# Patient Record
Sex: MALE | Race: WHITE | Employment: FULL TIME | URBAN - METROPOLITAN AREA
[De-identification: names, ages, dates, MRNs, and addresses within clinical notes are randomized per-mention and may not be internally consistent; named-entity substitution may affect disease eponyms.]

---

## 2017-06-14 PROBLEM — N50.89 TESTICULAR MASS: Status: ACTIVE | Noted: 2017-06-14

## 2017-06-29 RX ORDER — ACETAMINOPHEN 500 MG
TABLET ORAL
COMMUNITY
End: 2017-07-18

## 2017-06-30 ENCOUNTER — ANESTHESIA EVENT (OUTPATIENT)
Dept: SURGERY | Age: 42
End: 2017-06-30
Payer: COMMERCIAL

## 2017-07-03 ENCOUNTER — ANESTHESIA (OUTPATIENT)
Dept: SURGERY | Age: 42
End: 2017-07-03
Payer: COMMERCIAL

## 2017-07-03 ENCOUNTER — HOSPITAL ENCOUNTER (OUTPATIENT)
Age: 42
Setting detail: OUTPATIENT SURGERY
Discharge: HOME | End: 2017-07-03
Attending: UROLOGY | Admitting: UROLOGY
Payer: COMMERCIAL

## 2017-07-03 VITALS
DIASTOLIC BLOOD PRESSURE: 67 MMHG | TEMPERATURE: 98.5 F | BODY MASS INDEX: 34.45 KG/M2 | HEIGHT: 71 IN | WEIGHT: 246.06 LBS | HEART RATE: 68 BPM | SYSTOLIC BLOOD PRESSURE: 112 MMHG | OXYGEN SATURATION: 97 % | RESPIRATION RATE: 16 BRPM

## 2017-07-03 PROCEDURE — 77030018836 HC SOL IRR NACL ICUM -A: Performed by: UROLOGY

## 2017-07-03 PROCEDURE — 74011000250 HC RX REV CODE- 250: Performed by: UROLOGY

## 2017-07-03 PROCEDURE — 77030003029 HC SUT VCRL J&J -B: Performed by: UROLOGY

## 2017-07-03 PROCEDURE — 76210000006 HC OR PH I REC 0.5 TO 1 HR: Performed by: UROLOGY

## 2017-07-03 PROCEDURE — 76060000032 HC ANESTHESIA 0.5 TO 1 HR: Performed by: UROLOGY

## 2017-07-03 PROCEDURE — 74011250636 HC RX REV CODE- 250/636: Performed by: NURSE ANESTHETIST, CERTIFIED REGISTERED

## 2017-07-03 PROCEDURE — 77030032490 HC SLV COMPR SCD KNE COVD -B: Performed by: UROLOGY

## 2017-07-03 PROCEDURE — 74011000250 HC RX REV CODE- 250

## 2017-07-03 PROCEDURE — 77030012510 HC MSK AIRWY LMA TELE -B: Performed by: ANESTHESIOLOGY

## 2017-07-03 PROCEDURE — 76210000021 HC REC RM PH II 0.5 TO 1 HR: Performed by: UROLOGY

## 2017-07-03 PROCEDURE — 88304 TISSUE EXAM BY PATHOLOGIST: CPT | Performed by: UROLOGY

## 2017-07-03 PROCEDURE — 74011250636 HC RX REV CODE- 250/636

## 2017-07-03 PROCEDURE — 77030011640 HC PAD GRND REM COVD -A: Performed by: UROLOGY

## 2017-07-03 PROCEDURE — 74011250636 HC RX REV CODE- 250/636: Performed by: UROLOGY

## 2017-07-03 PROCEDURE — 77030018823 HC SLV COMPR VENO -B: Performed by: UROLOGY

## 2017-07-03 PROCEDURE — 77030032020 HC SUPP SCROT 3M -A: Performed by: UROLOGY

## 2017-07-03 PROCEDURE — 88305 TISSUE EXAM BY PATHOLOGIST: CPT | Performed by: UROLOGY

## 2017-07-03 PROCEDURE — 74011250637 HC RX REV CODE- 250/637: Performed by: NURSE ANESTHETIST, CERTIFIED REGISTERED

## 2017-07-03 PROCEDURE — 76010000160 HC OR TIME 0.5 TO 1 HR INTENSV-TIER 1: Performed by: UROLOGY

## 2017-07-03 RX ORDER — SODIUM CHLORIDE, SODIUM LACTATE, POTASSIUM CHLORIDE, CALCIUM CHLORIDE 600; 310; 30; 20 MG/100ML; MG/100ML; MG/100ML; MG/100ML
50 INJECTION, SOLUTION INTRAVENOUS CONTINUOUS
Status: DISCONTINUED | OUTPATIENT
Start: 2017-07-04 | End: 2017-07-03 | Stop reason: HOSPADM

## 2017-07-03 RX ORDER — PROPOFOL 10 MG/ML
INJECTION, EMULSION INTRAVENOUS AS NEEDED
Status: DISCONTINUED | OUTPATIENT
Start: 2017-07-03 | End: 2017-07-03 | Stop reason: HOSPADM

## 2017-07-03 RX ORDER — FAMOTIDINE 20 MG/1
TABLET, FILM COATED ORAL
Status: DISCONTINUED
Start: 2017-07-03 | End: 2017-07-03 | Stop reason: HOSPADM

## 2017-07-03 RX ORDER — DOCUSATE SODIUM 100 MG/1
100 CAPSULE, LIQUID FILLED ORAL
Qty: 60 CAP | Refills: 2 | Status: SHIPPED | OUTPATIENT
Start: 2017-07-03 | End: 2017-07-18

## 2017-07-03 RX ORDER — FENTANYL CITRATE 50 UG/ML
50 INJECTION, SOLUTION INTRAMUSCULAR; INTRAVENOUS AS NEEDED
Status: DISCONTINUED | OUTPATIENT
Start: 2017-07-03 | End: 2017-07-03 | Stop reason: HOSPADM

## 2017-07-03 RX ORDER — BUPIVACAINE HYDROCHLORIDE 2.5 MG/ML
INJECTION, SOLUTION EPIDURAL; INFILTRATION; INTRACAUDAL AS NEEDED
Status: DISCONTINUED | OUTPATIENT
Start: 2017-07-03 | End: 2017-07-03 | Stop reason: HOSPADM

## 2017-07-03 RX ORDER — FENTANYL CITRATE 50 UG/ML
INJECTION, SOLUTION INTRAMUSCULAR; INTRAVENOUS AS NEEDED
Status: DISCONTINUED | OUTPATIENT
Start: 2017-07-03 | End: 2017-07-03 | Stop reason: HOSPADM

## 2017-07-03 RX ORDER — SODIUM CHLORIDE, SODIUM LACTATE, POTASSIUM CHLORIDE, CALCIUM CHLORIDE 600; 310; 30; 20 MG/100ML; MG/100ML; MG/100ML; MG/100ML
50 INJECTION, SOLUTION INTRAVENOUS CONTINUOUS
Status: DISCONTINUED | OUTPATIENT
Start: 2017-07-03 | End: 2017-07-03 | Stop reason: HOSPADM

## 2017-07-03 RX ORDER — LIDOCAINE HYDROCHLORIDE 10 MG/ML
0.1 INJECTION, SOLUTION EPIDURAL; INFILTRATION; INTRACAUDAL; PERINEURAL AS NEEDED
Status: DISCONTINUED | OUTPATIENT
Start: 2017-07-03 | End: 2017-07-03 | Stop reason: HOSPADM

## 2017-07-03 RX ORDER — OXYCODONE AND ACETAMINOPHEN 5; 325 MG/1; MG/1
1-2 TABLET ORAL
Qty: 30 TAB | Refills: 0 | Status: SHIPPED | OUTPATIENT
Start: 2017-07-03 | End: 2017-07-18

## 2017-07-03 RX ORDER — MIDAZOLAM HYDROCHLORIDE 1 MG/ML
INJECTION, SOLUTION INTRAMUSCULAR; INTRAVENOUS AS NEEDED
Status: DISCONTINUED | OUTPATIENT
Start: 2017-07-03 | End: 2017-07-03 | Stop reason: HOSPADM

## 2017-07-03 RX ORDER — FAMOTIDINE 20 MG/1
20 TABLET, FILM COATED ORAL ONCE
Status: COMPLETED | OUTPATIENT
Start: 2017-07-04 | End: 2017-07-03

## 2017-07-03 RX ORDER — LIDOCAINE HYDROCHLORIDE 20 MG/ML
INJECTION, SOLUTION EPIDURAL; INFILTRATION; INTRACAUDAL; PERINEURAL AS NEEDED
Status: DISCONTINUED | OUTPATIENT
Start: 2017-07-03 | End: 2017-07-03 | Stop reason: HOSPADM

## 2017-07-03 RX ORDER — HYDROMORPHONE HYDROCHLORIDE 2 MG/ML
0.5 INJECTION, SOLUTION INTRAMUSCULAR; INTRAVENOUS; SUBCUTANEOUS
Status: DISCONTINUED | OUTPATIENT
Start: 2017-07-03 | End: 2017-07-03 | Stop reason: HOSPADM

## 2017-07-03 RX ORDER — CEFAZOLIN SODIUM 2 G/50ML
2 SOLUTION INTRAVENOUS
Status: COMPLETED | OUTPATIENT
Start: 2017-07-03 | End: 2017-07-03

## 2017-07-03 RX ADMIN — PROPOFOL 200 MG: 10 INJECTION, EMULSION INTRAVENOUS at 11:48

## 2017-07-03 RX ADMIN — LIDOCAINE HYDROCHLORIDE 100 MG: 20 INJECTION, SOLUTION EPIDURAL; INFILTRATION; INTRACAUDAL; PERINEURAL at 11:48

## 2017-07-03 RX ADMIN — MIDAZOLAM HYDROCHLORIDE 2 MG: 1 INJECTION, SOLUTION INTRAMUSCULAR; INTRAVENOUS at 11:44

## 2017-07-03 RX ADMIN — CEFAZOLIN SODIUM 2 G: 2 SOLUTION INTRAVENOUS at 11:56

## 2017-07-03 RX ADMIN — SODIUM CHLORIDE, SODIUM LACTATE, POTASSIUM CHLORIDE, AND CALCIUM CHLORIDE 50 ML/HR: 600; 310; 30; 20 INJECTION, SOLUTION INTRAVENOUS at 10:06

## 2017-07-03 RX ADMIN — FAMOTIDINE 20 MG: 20 TABLET ORAL at 09:55

## 2017-07-03 RX ADMIN — FENTANYL CITRATE 100 MCG: 50 INJECTION, SOLUTION INTRAMUSCULAR; INTRAVENOUS at 11:48

## 2017-07-03 NOTE — ANESTHESIA PREPROCEDURE EVALUATION
Anesthetic History   No history of anesthetic complications            Review of Systems / Medical History  Patient summary reviewed and pertinent labs reviewed    Pulmonary        Sleep apnea: No treatment           Neuro/Psych   Within defined limits           Cardiovascular  Within defined limits                Exercise tolerance: >4 METS     GI/Hepatic/Renal     GERD: well controlled           Endo/Other  Within defined limits           Other Findings   Comments:   Risk Factors for Postoperative nausea/vomiting:       History of postoperative nausea/vomiting? NO       Female? NO       Motion sickness? NO       Intended opioid administration for postoperative analgesia? YES      Smoking Abstinence  Current Smoker? NO  Elective Surgery? YES  Seen preoperatively by anesthesiologist or proxy prior to day of surgery? YES  Pt abstained from smoking 24 hours prior to anesthesia?  N/A           Physical Exam    Airway  Mallampati: II  TM Distance: 4 - 6 cm  Neck ROM: normal range of motion   Mouth opening: Normal     Cardiovascular    Rhythm: regular  Rate: normal         Dental  No notable dental hx       Pulmonary  Breath sounds clear to auscultation               Abdominal  GI exam deferred       Other Findings            Anesthetic Plan    ASA: 2  Anesthesia type: general          Induction: Intravenous  Anesthetic plan and risks discussed with: Patient

## 2017-07-03 NOTE — H&P
History and physical review. The patient has been examined. There have been no significant clinical changes. NAD, CTAB, RRR    L Side marked  Ancef On call to [address] [place] [address] to proceed with procedure as below    Manju Byers MD  [facility] of [address] [place] [address] [address] [facility], [address] [facility]  Phone: [phone]  Pager: [phone]          Maeve Rivera   1975   39 y.o.         ASSESSMENT:       ICD-10-CM ICD-9-CM   1. Testicular mass N50.9 608.89      1. Maeve Rivera 39 y.o. WM w/ a left sided testicular mass. Testicular US 5/31/2017 - Palpable abnormality corresponds with an oval solid mass in the wall of the left lateral scrotum.         PLAN:    Reviewed testicular US in detail, mass seen. At this time recommend an excision of left scrotal mass. Discussed alternatives including MRI, observation or needle biopsy  Discussed the likelihood that mass is benign. Return for scheduled surgery date. R/B discussed             Chief Complaint   Patient presents with    Other       testicular mass         HISTORY OF PRESENT ILLNESS:  Maeve Rivera is a 39 y.o. male who is seen in consultation for a scrotal mass. Pt states he thinks its been there x20 year   He has never experienced pain or other symptoms related to the lesion. Recently, he completed a US which revealed a 2.6 cm palpable oval shaped solid mass in the wall of the left lateral scrotum. Pt referred to discuss possible treatment options of his scrotal lesion. No drainage  Pt has no significant surgical history. Denies a concerning medical history. He is overall a healthy male.      Otherwise, pt is doing well. No bothersome urinary symptoms to report. Denies frequency, urgency, dysuria, gross hematuria.    No UTI symptoms such as fever, chills, nausea, or vomiting.      Location: Left scrotum  Severity: Asymptomatic  Duration: x20 years  Timing: Testicular US confirmed solid mass on 5/31/2017  Context: 2.6 cm left testicular mass      No flowsheet data found.     History reviewed. No pertinent past medical history.     History reviewed. No pertinent surgical history.          Social History   Substance Use Topics    Smoking status: Never Smoker    Smokeless tobacco: None    Alcohol use None      No Known Allergies     History reviewed. No pertinent family history.            Current Outpatient Prescriptions   Medication Sig Dispense Refill    CREATINE MONOHYDRATE (CREATINE PO) Take  by mouth.        naproxen sodium (ALEVE) 220 mg cap Take  by mouth.        MILK THISTLE PO Take  by mouth.        vit B Cmplx 3-FA-Vit C-Biotin (NEPHRO ALEXYS RX) 1- mg-mg-mcg tablet Take 1 Tab by mouth daily.        DOCOSAHEXANOIC ACID/EPA (FISH OIL PO) Take  by mouth.        omeprazole (PRILOSEC) 40 mg capsule TK 1 C PO D 30 MIN B MAKI   1      Review of Systems  Constitutional: Fever: negative  Skin: Rash:  negative  HEENT: Hearing difficulty:  negative  Eyes: Blurred vision:  negative  Cardiovascular: Chest pain:  negative  Respiratory: Shortness of breath:  negative  Gastrointestinal: Nausea/vomiting:  negative  Musculoskeletal: Back pain:  negative  Neurological: Weakness:  negative  Psychological: Memory loss:  negative  Comments/additional findings:  negative           PHYSICAL EXAMINATION:        Visit Vitals    /80    Ht 5' 11\" (1.803 m)    Wt 243 lb (110.2 kg)    BMI 33.89 kg/m2   Constitutional: WDWN, Pleasant and appropriate affect, No acute distress. CV:  No peripheral swelling noted  Respiratory: No respiratory distress or difficulties  Abdomen:  No abdominal masses or tenderness. No CVA tenderness. No inguinal hernias noted.  Male:    SCROTUM: 2 x 1 cm mobile solid mass at the base of the left scrotum separate from spermatic cord. No testicular mass  PENIS: Urethral meatus normal in location and size. No urethral discharge. Skin: No jaundice.     Neuro/Psych:  Alert and oriented x 3, affect appropriate. Lymphatic:   No enlarged inguinal lymph nodes.          REVIEW OF LABS AND IMAGING:    No results found for this or any previous visit.        US Testicular 5/31/2017  IMPRESSION:    Palpable abnormality corresponds with an oval solid mass in the wall of the left lateral scrotum. Etiology is unclear, could be a lipoma.  Malignant extratesticular scrotal masses are very uncommon.           A copy of today's office visit with all pertinent imaging results and labs were sent to the referring physician.    Maria Fernanda Meeks MD  [facility] [place]  [address] [facility], [address] [facility]  Phone: [phone]  Pager: [phone]

## 2017-07-03 NOTE — BRIEF OP NOTE
BRIEF OPERATIVE NOTE    Date of Procedure: 7/3/2017   Preoperative Diagnosis: testicular mass  n50.9  Postoperative Diagnosis: testicular mass  n50.9    Procedure(s):  excision of left scrotal mass  Surgeon(s) and Role:     * Mono Page MD - Primary         Assistant Staff:       Surgical Staff:  Circ-1: Tracy Lugo RN  Circ-Relief: Shahab Hill  Scrub Tech-1: [facility]  Event Time In   Incision Start 12:06 PM   Incision Close 12:25 PM     Anesthesia: General   Estimated Blood Loss: minimal  Specimens:   ID Type Source Tests Collected by Time Destination   1 : left scrotal mass Preservative   Mono Page MD 7/3/2017 12:16 PM Pathology      Findings: see dictation   Complications: none  Implants: * No implants in log *

## 2017-07-03 NOTE — DISCHARGE INSTRUCTIONS
DISCHARGE SUMMARY from Nurse    The following personal items are in your possession at time of discharge:    Dental Appliances: None  Visual Aid: None     Home Medications: None  Jewelry: None  Clothing: Shirt, Shorts, Footwear, Socks, Undergarments  Other Valuables: None             PATIENT INSTRUCTIONS:    After general anesthesia or intravenous sedation, for 24 hours or while taking prescription Narcotics:  · Limit your activities  · Do not drive and operate hazardous machinery  · Do not make important personal or business decisions  · Do  not drink alcoholic beverages  · If you have not urinated within 8 hours after discharge, please contact your surgeon on call. Report the following to your surgeon:  · Excessive pain, swelling, redness or odor of or around the surgical area  · Temperature over 100.5  · Nausea and vomiting lasting longer than 4 hours or if unable to take medications  · Any signs of decreased circulation or nerve impairment to extremity: change in color, persistent  numbness, tingling, coldness or increase pain  · Any questions        What to do at Home:        These are general instructions for a healthy lifestyle:    No smoking/ No tobacco products/ Avoid exposure to second hand smoke    Surgeon General's Warning:  Quitting smoking now greatly reduces serious risk to your health. Obesity, smoking, and sedentary lifestyle greatly increases your risk for illness    A healthy diet, regular physical exercise & weight monitoring are important for maintaining a healthy lifestyle    You may be retaining fluid if you have a history of heart failure or if you experience any of the following symptoms:  Weight gain of 3 pounds or more overnight or 5 pounds in a week, increased swelling in our hands or feet or shortness of breath while lying flat in bed. Please call your doctor as soon as you notice any of these symptoms; do not wait until your next office visit.     Recognize signs and symptoms of STROKE:    F-face looks uneven    A-arms unable to move or move unevenly    S-speech slurred or non-existent    T-time-call 911 as soon as signs and symptoms begin-DO NOT go       Back to bed or wait to see if you get better-TIME IS BRAIN. Warning Signs of HEART ATTACK     Call 911 if you have these symptoms:   Chest discomfort. Most heart attacks involve discomfort in the center of the chest that lasts more than a few minutes, or that goes away and comes back. It can feel like uncomfortable pressure, squeezing, fullness, or pain.  Discomfort in other areas of the upper body. Symptoms can include pain or discomfort in one or both arms, the back, neck, jaw, or stomach.  Shortness of breath with or without chest discomfort.  Other signs may include breaking out in a cold sweat, nausea, or lightheadedness. Don't wait more than five minutes to call 911 - MINUTES MATTER! Fast action can save your life. Calling 911 is almost always the fastest way to get lifesaving treatment. Emergency Medical Services staff can begin treatment when they arrive -- up to an hour sooner than if someone gets to the hospital by car. The discharge information has been reviewed with the patient. The patient verbalized understanding. Discharge medications reviewed with the patient and appropriate educational materials and side effects teaching were provided. Patient armband removed and given to patient to take home.   Patient was informed of the privacy risks if armband lost or stolen

## 2017-07-03 NOTE — IP AVS SNAPSHOT
[address] [place] [address] Genesis Aylin Weinstein 
[phone] Patient: January Albarran MRN: [id number] :1975 You are allergic to the following No active allergies Recent Documentation Height Weight BMI Smoking Status 1.803 m 111.6 kg 34.32 kg/m2 Never Smoker Emergency Contacts Name Discharge Info Relation Home Work Mobile [address] CAREGIVER [3] Spouse [3]   [phone] About your hospitalization You were admitted on:  July 3, 2017 You last received care in the:  [facility] PHASE 2 RECOVERY You were discharged on:  July 3, 2017 Unit phone number:  [phone] Why you were hospitalized Your primary diagnosis was:  Not on File Providers Seen During Your Hospitalizations Provider Role Specialty Primary office phone Marlin Saez MD Attending Provider Urology [phone] Your Primary Care Physician (PCP) Primary Care Physician Office Phone Office Fax Ashlee Vernon [phone] Follow-up Information Follow up With Details Comments Contact Info Carolyne Leger MD   [address] [place] [address] [address] [id number] 
[phone] Your Appointments 2017  1:30 PM EDT  
ESTABLISHED PATIENT with Marlin Saez MD  
Urology of [facility]) [address] [facility], [facility] [address] [address]  
[phone] Current Discharge Medication List  
  
START taking these medications Dose & Instructions Dispensing Information Comments Morning Noon Evening Bedtime  
 docusate sodium 100 mg capsule Commonly known as:  Nimesh Rivers Your last dose was: Your next dose is:    
   
   
 Dose:  100 mg Take 1 Cap by mouth two (2) times daily as needed for Constipation for up to 90 days. Quantity:  60 Cap Refills:  2 oxyCODONE-acetaminophen 5-325 mg per tablet Commonly known as:  PERCOCET Your last dose was: Your next dose is:    
   
   
 Dose:  1-2 Tab Take 1-2 Tabs by mouth every four (4) hours as needed for Pain. Max Daily Amount: 12 Tabs. Quantity:  30 Tab Refills:  0 CONTINUE these medications which have NOT CHANGED Dose & Instructions Dispensing Information Comments Morning Noon Evening Bedtime ALEVE 220 mg Cap Generic drug:  naproxen sodium Your last dose was: Your next dose is: Take  by mouth. Refills:  0  
     
   
   
   
  
 CREATINE PO Your last dose was: Your next dose is: Take  by mouth. Refills:  0  
     
   
   
   
  
 FISH OIL PO Your last dose was: Your next dose is: Take  by mouth. Refills:  0 MILK THISTLE PO Your last dose was: Your next dose is: Take  by mouth. Refills:  0  
     
   
   
   
  
 omeprazole 40 mg capsule Commonly known as:  PRILOSEC Your last dose was: Your next dose is:    
   
   
 TK 1 C PO D 30 MIN B MAKI Refills:  1 TYLENOL EXTRA STRENGTH 500 mg tablet Generic drug:  acetaminophen Your last dose was: Your next dose is: Take  by mouth every six (6) hours as needed for Pain. Refills:  0  
     
   
   
   
  
 vit B Cmplx 3-FA-Vit C-Biotin 1- mg-mg-mcg tablet Commonly known as:  NEPHRO ALEXYS RX Your last dose was: Your next dose is:    
   
   
 Dose:  1 Tab Take 1 Tab by mouth daily. Refills:  0 Where to Get Your Medications Information on where to get these meds will be given to you by the nurse or doctor. ! Ask your nurse or doctor about these medications  
  docusate sodium 100 mg capsule oxyCODONE-acetaminophen 5-325 mg per tablet Discharge Instructions DISCHARGE SUMMARY from Nurse The following personal items are in your possession at time of discharge: 
 
Dental Appliances: None Visual Aid: None Home Medications: None Jewelry: None Clothing: Shirt, Shorts, Footwear, Socks, Undergarments Other Valuables: None PATIENT INSTRUCTIONS: 
 
After general anesthesia or intravenous sedation, for 24 hours or while taking prescription Narcotics: · Limit your activities · Do not drive and operate hazardous machinery · Do not make important personal or business decisions · Do  not drink alcoholic beverages · If you have not urinated within 8 hours after discharge, please contact your surgeon on call. Report the following to your surgeon: 
· Excessive pain, swelling, redness or odor of or around the surgical area · Temperature over 100.5 · Nausea and vomiting lasting longer than 4 hours or if unable to take medications · Any signs of decreased circulation or nerve impairment to extremity: change in color, persistent  numbness, tingling, coldness or increase pain · Any questions What to do at Home: These are general instructions for a healthy lifestyle: No smoking/ No tobacco products/ Avoid exposure to second hand smoke Surgeon General's Warning:  Quitting smoking now greatly reduces serious risk to your health. Obesity, smoking, and sedentary lifestyle greatly increases your risk for illness A healthy diet, regular physical exercise & weight monitoring are important for maintaining a healthy lifestyle You may be retaining fluid if you have a history of heart failure or if you experience any of the following symptoms:  Weight gain of 3 pounds or more overnight or 5 pounds in a week, increased swelling in our hands or feet or shortness of breath while lying flat in bed.   Please call your doctor as soon as you notice any of these symptoms; do not wait until your next office visit. Recognize signs and symptoms of STROKE: 
 
F-face looks uneven A-arms unable to move or move unevenly S-speech slurred or non-existent T-time-call 911 as soon as signs and symptoms begin-DO NOT go Back to bed or wait to see if you get better-TIME IS BRAIN. Warning Signs of HEART ATTACK Call 911 if you have these symptoms: 
? Chest discomfort. Most heart attacks involve discomfort in the center of the chest that lasts more than a few minutes, or that goes away and comes back. It can feel like uncomfortable pressure, squeezing, fullness, or pain. ? Discomfort in other areas of the upper body. Symptoms can include pain or discomfort in one or both arms, the back, neck, jaw, or stomach. ? Shortness of breath with or without chest discomfort. ? Other signs may include breaking out in a cold sweat, nausea, or lightheadedness. Don't wait more than five minutes to call [address]! Fast action can save your life. Calling 911 is almost always the fastest way to get lifesaving treatment. Emergency Medical Services staff can begin treatment when they arrive  up to an hour sooner than if someone gets to the hospital by car. The discharge information has been reviewed with the patient. The patient verbalized understanding. Discharge medications reviewed with the patient and appropriate educational materials and side effects teaching were provided. Patient armband removed and given to patient to take home. Patient was informed of the privacy risks if armband lost or stolen Discharge Orders None Introducing [facility] SERVICES! Devika Huber introduces [employer] patient portal. Now you can access parts of your medical record, email your doctor's office, and request medication refills online. 1. In your internet browser, go to [email or web address] 2. Click on the First Time User? Click Here link in the Sign In box. You will see the New Member Sign Up page. 3. Enter your [employer] Access Code exactly as it appears below. You will not need to use this code after youve completed the sign-up process. If you do not sign up before the expiration date, you must request a new code. · [employer] Access Code: [email or web address] Expires: 9/28/2017 10:39 AM 
 
4. Enter the last four digits of your Social Security Number (xxxx) and Date of Birth (mm/dd/yyyy) as indicated and click Submit. You will be taken to the next sign-up page. 5. Create a [employer] ID. This will be your [employer] login ID and cannot be changed, so think of one that is secure and easy to remember. 6. Create a [employer] password. You can change your password at any time. 7. Enter your Password Reset Question and Answer. This can be used at a later time if you forget your password. 8. Enter your e-mail address. You will receive e-mail notification when new information is available in [address] 9. Click Sign Up. You can now view and download portions of your medical record. 10. Click the Download Summary menu link to download a portable copy of your medical information. If you have questions, please visit the Frequently Asked Questions section of the [employer] website. Remember, [employer] is NOT to be used for urgent needs. For medical emergencies, dial 911. Now available from your iPhone and Android! General Information Please provide this summary of care documentation to your next provider. Patient Signature:  ____________________________________________________________ Date:  ____________________________________________________________  
  
Effie Jean Provider Signature:  ____________________________________________________________ Date:  ____________________________________________________________

## 2017-07-03 NOTE — PERIOP NOTES
1235  Patient received in PACU and connected to monitors. Vital signs stable. RN at bedside. Will continue to monitor. 0  Spoke to pt's wife in waiting area re update and plan of care. Awaiting anesthesia sign out.

## 2017-07-03 NOTE — ANESTHESIA POSTPROCEDURE EVALUATION
Post-Anesthesia Evaluation and Assessment    Patient: Storm Cary MRN: [id number]  SSN: [id number]    YOB: 1975  Age: 39 y.o. Sex: male       Cardiovascular Function/Vital Signs  Visit Vitals    /73    Pulse 65    Temp 36.9 °C (98.5 °F)    Resp 10    Ht 5' 11\" (1.803 m)    Wt 111.6 kg (246 lb 1 oz)    SpO2 97%    BMI 34.32 kg/m2       Patient is status post general anesthesia for Procedure(s):  excision of left scrotal mass. Nausea/Vomiting: None    Postoperative hydration reviewed and adequate. Pain:  Pain Scale 1: Numeric (0 - 10) (07/03/17 1305)  Pain Intensity 1: 2 (07/03/17 1305)   Managed    Neurological Status:   Neuro (WDL): Within Defined Limits (07/03/17 1235)   At baseline    Mental Status and Level of Consciousness: Alert and oriented     Pulmonary Status:   O2 Device: Room air (07/03/17 1305)   Adequate oxygenation and airway patent    Complications related to anesthesia: None    Post-anesthesia assessment completed.  No concerns    Signed By: Vinay Howard MD     July 3, 2017

## 2017-07-04 NOTE — OP NOTES
Cirilo Moreau    Name:  Grey Brumfield  MR#:  [id number]  :  1975  Account #:  [de-identified]  Date of Adm:  2017  Date of Surgery:  2017      PREOPERATIVE DIAGNOSIS: Left paratesticular scrotal mass. POSTOPERATIVE DIAGNOSIS: Left paratesticular scrotal mass. PROCEDURES PERFORMED: Excision of left scrotal mass. SURGEON: Randi Barton MD    ASSISTANT: None. ANESTHESIA: General.    FLUIDS: Crystalloid. ESTIMATED BLOOD LOSS: Minimal.    SPECIMENS REMOVED: Left scrotal mass. DRAINS: None. INTRAOPERATIVE FINDINGS: On scrotal exploration, a 3 cm  superficial mass was noted, not associated with the left testicle or cord. This mass appeared to be superficial to the Dartos fascia, consistent  with a lipoma. INDICATIONS FOR THE PROCEDURE: The patient is a 17-year-old  male with a longstanding history of a left superficial scrotal mass. The  patient has noticed that it has continued to get larger in size. Risks,  benefits, and alternatives of the above-stated procedure were  explained. The patient decided to proceed. DETAILS OF THE PROCEDURE: The patient was first identified in the  holding area and taken back to the operating room. Perioperative  antibiotics were given and sequential compression devices placed. Anesthesia was induced. The patient was placed in the supine position  taking care to pad all pressure points. The patient was prepped and  draped in the usual sterile fashion. A timeout was performed. First, the mass was located on the posterior aspect of the scrotum,  near the scrotal peritoneal junction. It appeared superficial. We made  approximately a 2.5 cm using a #15 surgical steel blade. We then used  hemostats and cautery to excise the mass entirely. There was a small  vessel at the inferior aspect of the mass that was tied with a 3-0 silk tie.   Once this was done, we assured hemostasis and the mass was  removed intact. We first closed the subcuticular layer with a running 3-  0 Vicryl after first infiltrating 10 mL of Marcaine. We then closed the  skin with a 4-0 Chromic in an interrupted horizontal mattress fashion. Hemostasis had been assured. All lap, sponge and needle counts were  correct. The patient was awakened from anesthesia and taken back  to PACU in stable condition. I was scrubbed and present for all critical  portions of the case.         MD ELENA Goss / EPI  D:  07/03/2017   12:34  T:  07/03/2017   20:45  Job #:  [id number]

## 2017-07-18 PROBLEM — D17.9 LIPOMA: Status: ACTIVE | Noted: 2017-07-18

## 2022-03-18 PROBLEM — N50.89 TESTICULAR MASS: Status: ACTIVE | Noted: 2017-06-14

## 2022-03-19 PROBLEM — D17.9 LIPOMA: Status: ACTIVE | Noted: 2017-07-18

## 2023-08-30 ENCOUNTER — HOSPITAL ENCOUNTER (OUTPATIENT)
Facility: HOSPITAL | Age: 48
Setting detail: RECURRING SERIES
Discharge: HOME | End: 2023-09-02
Payer: COMMERCIAL

## 2023-08-30 PROCEDURE — 97161 PT EVAL LOW COMPLEX 20 MIN: CPT

## 2023-08-30 PROCEDURE — 97535 SELF CARE MNGMENT TRAINING: CPT

## 2023-08-30 NOTE — THERAPY EVALUATION
Negative; Spurling's Test Negative right and Positive left. Not Tested/To Be Assessed Tinel's, Phalen's and Thoracic Outlet/Inlet testing. Patient would benefit from skilled PT to decrease pain and to address these deficits and to establish progressive HEP/Self-Care Routine to restore PLOF and prevent recurrence. Evaluation Complexity:  History:  LOW Complexity : Zero comorbidities / personal factors that will impact the outcome / POC; Examination:  MEDIUM Complexity : 3 Standardized tests and measures addressin body structure, function, activity limitation and / or participation in recreation  ;Presentation:  MEDIUM Complexity : Evolving with changing characteristics  ; Clinical Decision Making:  MEDIUM Complexity : FOTO score of 26-74 FOTO score = an established functional score where 100 = no disability  Overall Complexity Rating: MEDIUM  Problem List: pain affecting function, decrease ROM, decrease strength, decrease ADL/functional abilities, decrease activity tolerance, and decrease flexibility/joint mobility   Treatment Plan may include any combination of the followin Therapeutic Exercise, 62645 Neuromuscular Re-Education, 51843 Manual Therapy, 19784 Therapeutic Activity, 76771 Self Care/Home Management, 22066 Electrical Stim unattended, 56535 Ultrasound, and 41328 Mechanical Traction; MHP/CP prn. Patient / Family readiness to learn indicated by: asking questions, trying to perform skills, and interest  Persons(s) to be included in education: patient (P)  Barriers to Learning/Limitations: none  Measures taken if barriers to learning present: NA  Patient Goal (s): \"relief\"  Patient Self Reported Health Status: good  Rehabilitation Potential: good    Short Term Goals: To be accomplished in 2-3 weeks  Patient independent and compliant with progressive HEP and Self-Care Routine. Status at IE:  No HEP/Initiated. Decrease max pain scale rating to </= 2/10. Status at IE:  up to 6/10.   Increase

## 2023-08-30 NOTE — PROGRESS NOTES
Eval/POC. Patient will continue to benefit from skilled PT / OT services to modify and progress therapeutic interventions, analyze and address functional mobility deficits, analyze and address ROM deficits, analyze and address strength deficits, analyze and address soft tissue restrictions, analyze and cue for proper movement patterns, analyze and modify for postural abnormalities, and instruct in home and community integration to address functional deficits and attain remaining goals. Progress toward goals / Updated goals:  []  See Progress Note/Recertification    See Eval/POC.     Next PN/ RC due 09/28/2023  Auth due NA    PLAN  Yes  Continue plan of care  [x]  Upgrade activities as tolerated  []  Discharge due to :  []  Other:    Ramona Hankins PT    8/30/2023    4:10 PM    Future Appointments   Date Time Provider [address]   9/13/2023  8:20 AM Ramona Hankins PT [facility]   9/20/2023  8:20 AM Ramona Hankins PT [facility]   9/27/2023  8:20 AM Ramona Hankins PT [facility]   10/4/2023  8:20 AM Ramona Hankins PT [facility]

## 2023-09-06 ENCOUNTER — APPOINTMENT (OUTPATIENT)
Facility: HOSPITAL | Age: 48
End: 2023-09-06
Payer: COMMERCIAL

## 2023-09-13 ENCOUNTER — HOSPITAL ENCOUNTER (OUTPATIENT)
Facility: HOSPITAL | Age: 48
Setting detail: RECURRING SERIES
Discharge: HOME | End: 2023-09-16
Payer: COMMERCIAL

## 2023-09-13 PROCEDURE — 97110 THERAPEUTIC EXERCISES: CPT

## 2023-09-13 PROCEDURE — 97530 THERAPEUTIC ACTIVITIES: CPT

## 2023-09-13 NOTE — PROGRESS NOTES
today.  Increase cervical AROM for bilat side bending to >/= 25-30 deg. Status at IE:  26 deg right and 20 deg left. Did well with left UT, levator, and scalene stretching. Long Term Goals: To be accomplished in 4-6 weeks  Improve FOTO score to >/= 84. Status at IE:  76.   Reassess at NV. No numbness/tingling symptoms left UE with scapular or cervical AROM. Status at IE:  mild N/T left UE with scapular protractions; left UE N/T with cervcal left SB and left rotations. Pt keeping posture in slight cervical flexion/head forward to avoid symptoms. Negative Spurling's Test; negative Tinel's, Phalen's and Thoracic Outlet/Inlet testing. Status at IE:  Positive Spurling's Test left; Tinel's, Phalen's and Thoracic Outlet/Inlet testing TBA. Symptoms with nerve glides.     Next PN/ RC due 09/29/2023  Auth due 15th visit and/or 12/31/2023    PLAN  - Continue Plan of Care  - Upgrade activities as tolerated    Mikey Zarate, PT    9/13/2023    6:54 AM    Future Appointments   Date Time Provider Department Center   9/13/2023  8:20 AM Mikey Zarate PT [facility]   9/20/2023  8:20 AM Mikey Zarate, PT [facility]   9/27/2023  8:20 AM Mikey Zarate PT [facility]   10/4/2023  8:20 AM Mikey Zarate PT [facility]

## 2023-09-20 ENCOUNTER — HOSPITAL ENCOUNTER (OUTPATIENT)
Facility: HOSPITAL | Age: 48
Setting detail: RECURRING SERIES
Discharge: HOME | End: 2023-09-23
Payer: COMMERCIAL

## 2023-09-20 PROCEDURE — 97110 THERAPEUTIC EXERCISES: CPT

## 2023-09-20 PROCEDURE — 97112 NEUROMUSCULAR REEDUCATION: CPT

## 2023-09-20 NOTE — PROGRESS NOTES
today.  Increase cervical AROM for bilat side bending to >/= 25-30 deg. Status at IE:  26 deg right and 20 deg left. Did well with levator stretching--some right arm \"warmth\", then cool/tingling after right levator stretches. .  Long Term Goals: To be accomplished in 4-6 weeks  Improve FOTO score to >/= 84. Status at IE:  76.              Reassess at NV. No numbness/tingling symptoms left UE with scapular or cervical AROM. Status at IE:  mild N/T left UE with scapular protractions; left UE N/T with cervcal left SB and left rotations. Tolerated supine cervical retraction and retraction ==> extension activities well. Negative Spurling's Test; negative Tinel's, Phalen's and Thoracic Outlet/Inlet testing. Status at IE:  Positive Spurling's Test left; Tinel's, Phalen's and Thoracic Outlet/Inlet testing TBA. a little residual left thumb tingling symptoms after median ULNT glides--intermittent symptoms during nerve glides, more so with cervical SB than rotation.      Next PN/ RC due 09/29/2023  Auth due 15th visit and/or 12/31/2023    PLAN  - Continue Plan of Care  - Upgrade activities as tolerated    Sarai Remy PT    9/20/2023    6:58 AM    Future Appointments   Date Time Provider [address]   9/20/2023  8:20 AM Sarai Remy PT [facility]   9/27/2023  8:20 AM Sarai Remy PT [facility]   10/4/2023  8:20 AM Sarai Remy PT [facility]

## 2023-09-27 ENCOUNTER — APPOINTMENT (OUTPATIENT)
Facility: HOSPITAL | Age: 48
End: 2023-09-27
Payer: COMMERCIAL

## (undated) DEVICE — SUTURE VCRL SZ 3-0 L18IN ABSRB UD L26MM SH 1/2 CIR J864D

## (undated) DEVICE — SOLUTION IV 1000ML 0.9% SOD CHL

## (undated) DEVICE — NEEDLE HYPO 25GA L1.5IN BLU POLYPR HUB S STL REG BVL STR

## (undated) DEVICE — GAUZE BORDERED 4X4 --

## (undated) DEVICE — KENDALL SCD EXPRESS SLEEVES, KNEE LENGTH, MEDIUM: Brand: KENDALL SCD

## (undated) DEVICE — STERILE POLYISOPRENE POWDER-FREE SURGICAL GLOVES: Brand: PROTEXIS

## (undated) DEVICE — SYR 10ML CTRL LR LCK NSAF LF --

## (undated) DEVICE — Device

## (undated) DEVICE — REM POLYHESIVE ADULT PATIENT RETURN ELECTRODE: Brand: VALLEYLAB

## (undated) DEVICE — SUPPORT SCROT XL WHT COT ATH W/ LEG STRP ADJ E WAISTBAND

## (undated) DEVICE — PREP CHLORAPREP 10.5 ML ORG --